# Patient Record
Sex: MALE
[De-identification: names, ages, dates, MRNs, and addresses within clinical notes are randomized per-mention and may not be internally consistent; named-entity substitution may affect disease eponyms.]

---

## 2021-10-20 PROBLEM — Z00.00 ENCOUNTER FOR PREVENTIVE HEALTH EXAMINATION: Status: ACTIVE | Noted: 2021-10-20

## 2021-10-27 ENCOUNTER — APPOINTMENT (OUTPATIENT)
Dept: PSYCHIATRY | Facility: CLINIC | Age: 31
End: 2021-10-27
Payer: COMMERCIAL

## 2021-10-27 PROCEDURE — 90791 PSYCH DIAGNOSTIC EVALUATION: CPT | Mod: 95

## 2021-11-01 ENCOUNTER — APPOINTMENT (OUTPATIENT)
Dept: PSYCHIATRY | Facility: CLINIC | Age: 31
End: 2021-11-01
Payer: COMMERCIAL

## 2021-11-01 PROCEDURE — 90834 PSYTX W PT 45 MINUTES: CPT | Mod: 95

## 2021-11-11 ENCOUNTER — APPOINTMENT (OUTPATIENT)
Dept: PSYCHIATRY | Facility: CLINIC | Age: 31
End: 2021-11-11
Payer: COMMERCIAL

## 2021-11-11 PROCEDURE — 90847 FAMILY PSYTX W/PT 50 MIN: CPT | Mod: 95

## 2021-11-18 ENCOUNTER — APPOINTMENT (OUTPATIENT)
Dept: PSYCHIATRY | Facility: CLINIC | Age: 31
End: 2021-11-18

## 2021-11-19 ENCOUNTER — APPOINTMENT (OUTPATIENT)
Dept: PSYCHIATRY | Facility: CLINIC | Age: 31
End: 2021-11-19

## 2021-11-19 ENCOUNTER — APPOINTMENT (OUTPATIENT)
Dept: PSYCHIATRY | Facility: CLINIC | Age: 31
End: 2021-11-19
Payer: COMMERCIAL

## 2021-11-19 PROCEDURE — 90834 PSYTX W PT 45 MINUTES: CPT | Mod: 95

## 2021-11-23 ENCOUNTER — APPOINTMENT (OUTPATIENT)
Dept: PSYCHIATRY | Facility: CLINIC | Age: 31
End: 2021-11-23
Payer: COMMERCIAL

## 2021-11-23 PROCEDURE — 90837 PSYTX W PT 60 MINUTES: CPT | Mod: 95

## 2021-11-24 ENCOUNTER — APPOINTMENT (OUTPATIENT)
Dept: PSYCHIATRY | Facility: CLINIC | Age: 31
End: 2021-11-24

## 2021-12-02 ENCOUNTER — APPOINTMENT (OUTPATIENT)
Dept: PSYCHIATRY | Facility: CLINIC | Age: 31
End: 2021-12-02
Payer: COMMERCIAL

## 2021-12-02 PROCEDURE — 90837 PSYTX W PT 60 MINUTES: CPT | Mod: 95

## 2021-12-09 ENCOUNTER — APPOINTMENT (OUTPATIENT)
Dept: PSYCHIATRY | Facility: CLINIC | Age: 31
End: 2021-12-09
Payer: COMMERCIAL

## 2021-12-09 PROCEDURE — 90837 PSYTX W PT 60 MINUTES: CPT | Mod: 95

## 2021-12-16 ENCOUNTER — APPOINTMENT (OUTPATIENT)
Dept: PSYCHIATRY | Facility: CLINIC | Age: 31
End: 2021-12-16
Payer: COMMERCIAL

## 2021-12-16 PROCEDURE — 90837 PSYTX W PT 60 MINUTES: CPT | Mod: 95

## 2021-12-23 ENCOUNTER — APPOINTMENT (OUTPATIENT)
Dept: PSYCHIATRY | Facility: CLINIC | Age: 31
End: 2021-12-23
Payer: COMMERCIAL

## 2021-12-23 PROCEDURE — 90837 PSYTX W PT 60 MINUTES: CPT | Mod: 95

## 2021-12-30 ENCOUNTER — APPOINTMENT (OUTPATIENT)
Dept: PSYCHIATRY | Facility: CLINIC | Age: 31
End: 2021-12-30

## 2022-01-06 ENCOUNTER — APPOINTMENT (OUTPATIENT)
Dept: PSYCHIATRY | Facility: CLINIC | Age: 32
End: 2022-01-06
Payer: COMMERCIAL

## 2022-01-06 PROCEDURE — 90837 PSYTX W PT 60 MINUTES: CPT | Mod: 95

## 2022-01-13 ENCOUNTER — APPOINTMENT (OUTPATIENT)
Dept: PSYCHIATRY | Facility: CLINIC | Age: 32
End: 2022-01-13
Payer: COMMERCIAL

## 2022-01-13 PROCEDURE — 90837 PSYTX W PT 60 MINUTES: CPT | Mod: 95

## 2022-01-20 ENCOUNTER — APPOINTMENT (OUTPATIENT)
Dept: PSYCHIATRY | Facility: CLINIC | Age: 32
End: 2022-01-20
Payer: COMMERCIAL

## 2022-01-20 PROCEDURE — 90837 PSYTX W PT 60 MINUTES: CPT | Mod: 95

## 2022-01-27 ENCOUNTER — APPOINTMENT (OUTPATIENT)
Dept: PSYCHIATRY | Facility: CLINIC | Age: 32
End: 2022-01-27
Payer: COMMERCIAL

## 2022-01-27 PROCEDURE — 90837 PSYTX W PT 60 MINUTES: CPT | Mod: 95

## 2022-02-03 ENCOUNTER — APPOINTMENT (OUTPATIENT)
Dept: PSYCHIATRY | Facility: CLINIC | Age: 32
End: 2022-02-03

## 2022-02-10 ENCOUNTER — APPOINTMENT (OUTPATIENT)
Dept: PSYCHIATRY | Facility: CLINIC | Age: 32
End: 2022-02-10
Payer: COMMERCIAL

## 2022-02-10 PROCEDURE — 90837 PSYTX W PT 60 MINUTES: CPT | Mod: 95

## 2022-02-17 ENCOUNTER — APPOINTMENT (OUTPATIENT)
Dept: PSYCHIATRY | Facility: CLINIC | Age: 32
End: 2022-02-17
Payer: COMMERCIAL

## 2022-02-17 PROCEDURE — 90837 PSYTX W PT 60 MINUTES: CPT | Mod: 95

## 2022-02-24 ENCOUNTER — APPOINTMENT (OUTPATIENT)
Dept: PSYCHIATRY | Facility: CLINIC | Age: 32
End: 2022-02-24
Payer: COMMERCIAL

## 2022-02-24 PROCEDURE — 90837 PSYTX W PT 60 MINUTES: CPT | Mod: 95

## 2022-03-03 ENCOUNTER — APPOINTMENT (OUTPATIENT)
Dept: PSYCHIATRY | Facility: CLINIC | Age: 32
End: 2022-03-03
Payer: COMMERCIAL

## 2022-03-03 PROCEDURE — 90837 PSYTX W PT 60 MINUTES: CPT | Mod: 95

## 2022-03-10 ENCOUNTER — APPOINTMENT (OUTPATIENT)
Dept: PSYCHIATRY | Facility: CLINIC | Age: 32
End: 2022-03-10
Payer: COMMERCIAL

## 2022-03-10 PROCEDURE — 90837 PSYTX W PT 60 MINUTES: CPT | Mod: 95

## 2022-03-16 ENCOUNTER — APPOINTMENT (OUTPATIENT)
Dept: PSYCHIATRY | Facility: CLINIC | Age: 32
End: 2022-03-16
Payer: COMMERCIAL

## 2022-03-16 PROCEDURE — 99205 OFFICE O/P NEW HI 60 MIN: CPT

## 2022-03-17 ENCOUNTER — APPOINTMENT (OUTPATIENT)
Dept: PSYCHIATRY | Facility: CLINIC | Age: 32
End: 2022-03-17
Payer: COMMERCIAL

## 2022-03-17 PROCEDURE — 90837 PSYTX W PT 60 MINUTES: CPT | Mod: 95

## 2022-03-24 ENCOUNTER — APPOINTMENT (OUTPATIENT)
Dept: PSYCHIATRY | Facility: CLINIC | Age: 32
End: 2022-03-24
Payer: COMMERCIAL

## 2022-03-24 PROCEDURE — 90837 PSYTX W PT 60 MINUTES: CPT | Mod: 95

## 2022-03-31 ENCOUNTER — APPOINTMENT (OUTPATIENT)
Dept: PSYCHIATRY | Facility: CLINIC | Age: 32
End: 2022-03-31
Payer: COMMERCIAL

## 2022-03-31 PROCEDURE — 90837 PSYTX W PT 60 MINUTES: CPT | Mod: 95

## 2022-04-07 ENCOUNTER — APPOINTMENT (OUTPATIENT)
Dept: PSYCHIATRY | Facility: CLINIC | Age: 32
End: 2022-04-07
Payer: COMMERCIAL

## 2022-04-07 PROCEDURE — 90837 PSYTX W PT 60 MINUTES: CPT | Mod: 95

## 2022-04-13 ENCOUNTER — APPOINTMENT (OUTPATIENT)
Dept: PSYCHIATRY | Facility: CLINIC | Age: 32
End: 2022-04-13
Payer: COMMERCIAL

## 2022-04-13 PROCEDURE — 99214 OFFICE O/P EST MOD 30 MIN: CPT | Mod: 95

## 2022-04-14 ENCOUNTER — APPOINTMENT (OUTPATIENT)
Dept: PSYCHIATRY | Facility: CLINIC | Age: 32
End: 2022-04-14
Payer: COMMERCIAL

## 2022-04-14 PROCEDURE — 90837 PSYTX W PT 60 MINUTES: CPT | Mod: 95

## 2022-04-21 ENCOUNTER — APPOINTMENT (OUTPATIENT)
Dept: PSYCHIATRY | Facility: CLINIC | Age: 32
End: 2022-04-21
Payer: COMMERCIAL

## 2022-04-21 PROCEDURE — 90837 PSYTX W PT 60 MINUTES: CPT | Mod: 95

## 2022-04-28 ENCOUNTER — APPOINTMENT (OUTPATIENT)
Dept: PSYCHIATRY | Facility: CLINIC | Age: 32
End: 2022-04-28
Payer: COMMERCIAL

## 2022-04-28 PROCEDURE — 90837 PSYTX W PT 60 MINUTES: CPT | Mod: 95

## 2022-05-12 ENCOUNTER — RX RENEWAL (OUTPATIENT)
Age: 32
End: 2022-05-12

## 2022-05-18 ENCOUNTER — APPOINTMENT (OUTPATIENT)
Dept: PSYCHIATRY | Facility: CLINIC | Age: 32
End: 2022-05-18
Payer: COMMERCIAL

## 2022-05-18 PROCEDURE — 99214 OFFICE O/P EST MOD 30 MIN: CPT | Mod: 95

## 2022-05-18 RX ORDER — ESCITALOPRAM OXALATE 5 MG/1
5 TABLET ORAL DAILY
Qty: 30 | Refills: 0 | Status: COMPLETED | COMMUNITY
Start: 2022-03-16 | End: 2022-05-18

## 2022-05-18 RX ORDER — PROPRANOLOL HYDROCHLORIDE 10 MG/1
10 TABLET ORAL
Qty: 30 | Refills: 0 | Status: ACTIVE | COMMUNITY
Start: 2022-03-16 | End: 1900-01-01

## 2022-05-26 ENCOUNTER — APPOINTMENT (OUTPATIENT)
Dept: PSYCHIATRY | Facility: CLINIC | Age: 32
End: 2022-05-26
Payer: COMMERCIAL

## 2022-05-26 PROCEDURE — 90837 PSYTX W PT 60 MINUTES: CPT | Mod: 95

## 2022-06-02 ENCOUNTER — APPOINTMENT (OUTPATIENT)
Dept: PSYCHIATRY | Facility: CLINIC | Age: 32
End: 2022-06-02
Payer: COMMERCIAL

## 2022-06-02 PROCEDURE — 90837 PSYTX W PT 60 MINUTES: CPT | Mod: 95

## 2022-06-09 ENCOUNTER — APPOINTMENT (OUTPATIENT)
Dept: PSYCHIATRY | Facility: CLINIC | Age: 32
End: 2022-06-09

## 2022-06-14 ENCOUNTER — RX RENEWAL (OUTPATIENT)
Age: 32
End: 2022-06-14

## 2022-06-20 ENCOUNTER — APPOINTMENT (OUTPATIENT)
Dept: PSYCHIATRY | Facility: CLINIC | Age: 32
End: 2022-06-20
Payer: COMMERCIAL

## 2022-06-20 PROCEDURE — 99214 OFFICE O/P EST MOD 30 MIN: CPT | Mod: 95

## 2022-06-20 RX ORDER — BUPROPION HYDROCHLORIDE 75 MG/1
75 TABLET, FILM COATED ORAL
Qty: 30 | Refills: 0 | Status: COMPLETED | COMMUNITY
Start: 2022-05-18 | End: 2022-06-20

## 2022-06-23 ENCOUNTER — APPOINTMENT (OUTPATIENT)
Dept: PSYCHIATRY | Facility: CLINIC | Age: 32
End: 2022-06-23
Payer: COMMERCIAL

## 2022-06-23 PROCEDURE — 90837 PSYTX W PT 60 MINUTES: CPT | Mod: 95

## 2022-06-30 ENCOUNTER — APPOINTMENT (OUTPATIENT)
Dept: PSYCHIATRY | Facility: CLINIC | Age: 32
End: 2022-06-30

## 2022-06-30 PROCEDURE — 90837 PSYTX W PT 60 MINUTES: CPT | Mod: 95

## 2022-07-14 ENCOUNTER — APPOINTMENT (OUTPATIENT)
Dept: PSYCHIATRY | Facility: CLINIC | Age: 32
End: 2022-07-14

## 2022-07-14 PROCEDURE — 90837 PSYTX W PT 60 MINUTES: CPT | Mod: 95

## 2022-07-21 ENCOUNTER — APPOINTMENT (OUTPATIENT)
Dept: PSYCHIATRY | Facility: CLINIC | Age: 32
End: 2022-07-21

## 2022-07-29 ENCOUNTER — APPOINTMENT (OUTPATIENT)
Dept: PSYCHIATRY | Facility: CLINIC | Age: 32
End: 2022-07-29

## 2022-07-29 PROCEDURE — 99214 OFFICE O/P EST MOD 30 MIN: CPT | Mod: 95

## 2022-08-18 ENCOUNTER — APPOINTMENT (OUTPATIENT)
Dept: PSYCHIATRY | Facility: CLINIC | Age: 32
End: 2022-08-18

## 2022-08-18 PROCEDURE — 90837 PSYTX W PT 60 MINUTES: CPT | Mod: 95

## 2022-08-25 ENCOUNTER — NON-APPOINTMENT (OUTPATIENT)
Age: 32
End: 2022-08-25

## 2022-08-25 ENCOUNTER — APPOINTMENT (OUTPATIENT)
Dept: PSYCHIATRY | Facility: CLINIC | Age: 32
End: 2022-08-25

## 2022-08-26 ENCOUNTER — APPOINTMENT (OUTPATIENT)
Dept: PSYCHIATRY | Facility: CLINIC | Age: 32
End: 2022-08-26

## 2022-08-26 PROCEDURE — 99214 OFFICE O/P EST MOD 30 MIN: CPT | Mod: 95

## 2022-09-08 ENCOUNTER — APPOINTMENT (OUTPATIENT)
Dept: PSYCHIATRY | Facility: CLINIC | Age: 32
End: 2022-09-08

## 2022-09-08 PROCEDURE — 90837 PSYTX W PT 60 MINUTES: CPT | Mod: 95

## 2022-09-15 ENCOUNTER — APPOINTMENT (OUTPATIENT)
Dept: PSYCHIATRY | Facility: CLINIC | Age: 32
End: 2022-09-15

## 2022-09-15 PROCEDURE — 90837 PSYTX W PT 60 MINUTES: CPT | Mod: 95

## 2022-09-22 ENCOUNTER — APPOINTMENT (OUTPATIENT)
Dept: PSYCHIATRY | Facility: CLINIC | Age: 32
End: 2022-09-22

## 2022-10-06 ENCOUNTER — APPOINTMENT (OUTPATIENT)
Dept: PSYCHIATRY | Facility: CLINIC | Age: 32
End: 2022-10-06

## 2022-10-06 PROCEDURE — 90837 PSYTX W PT 60 MINUTES: CPT | Mod: 95

## 2022-10-07 NOTE — PLAN
[FreeTextEntry2] : Increased understanding of PTSD (psychoeducation, IFS, psychodynamic therapy)\par Identify cues and symptoms that he is experiencing PTSD (and similar symptoms of anxiety)\par Develop vocabulary to describe anxiety or fears\par Increased understanding of interpersonal dynamics and consequences of abandonment and neglect by either or both parents.\par  [Psychodynamic Therapy] : Psychodynamic Therapy  [Supportive Therapy] : Supportive Therapy [Other: ____] : [unfilled] [de-identified] : Pt. was engaged in session; affect and mood WNL. This session pt. and SW discussed and processed his self image, and how he has historically "minimized his achievements and strengths" for fear of seeming arrogant. Pt. spoke about his self image and being uncomfortable being certain places and being recognized for certain things if he feels he has not "earned or deserved" the accomplishment. SW empathized with pt. and explored this part of him in the context of childhood family dynamics and his relationship with his father.  [Recommended Frequency of Visits: ____] : Recommended frequency of visits: [unfilled] [Return in ____ week(s)] : Return in [unfilled] week(s) [FreeTextEntry1] : Meet for weekly individual counseling sessions; level of care appropriate.

## 2022-10-07 NOTE — REASON FOR VISIT
[Home] : at home, [unfilled] , at the time of the visit. [Medical Office: ([facility])___] : at the medical office located in  [Self] : self [This encounter was initiated by telehealth (audio with video) and converted to telephone (audio only) due to technical difficulties.] : This encounter was initiated by telehealth (audio with video) and converted to telephone (audio only) due to technical difficulties. [FreeTextEntry2] : Jose Barr [Patient] : Patient

## 2022-10-07 NOTE — PHYSICAL EXAM
[Well groomed] : well groomed [Cooperative] : cooperative [Euthymic] : euthymic [Full] : full [Clear] : clear [Linear/Goal Directed] : linear/goal directed [None] : none [None Reported] : none reported [Average] : average [WNL] : within normal limits

## 2022-10-13 ENCOUNTER — APPOINTMENT (OUTPATIENT)
Dept: PSYCHIATRY | Facility: CLINIC | Age: 32
End: 2022-10-13

## 2022-10-13 ENCOUNTER — NON-APPOINTMENT (OUTPATIENT)
Age: 32
End: 2022-10-13

## 2022-10-27 ENCOUNTER — NON-APPOINTMENT (OUTPATIENT)
Age: 32
End: 2022-10-27

## 2022-10-27 ENCOUNTER — APPOINTMENT (OUTPATIENT)
Dept: PSYCHIATRY | Facility: CLINIC | Age: 32
End: 2022-10-27

## 2022-11-10 ENCOUNTER — APPOINTMENT (OUTPATIENT)
Dept: PSYCHIATRY | Facility: CLINIC | Age: 32
End: 2022-11-10

## 2022-11-10 PROCEDURE — 90837 PSYTX W PT 60 MINUTES: CPT | Mod: 95

## 2022-11-11 NOTE — REASON FOR VISIT
[Home] : at home, [unfilled] , at the time of the visit. [Medical Office: ([facility])___] : at the medical office located in  [Patient] : the patient [Self] : self [This encounter was initiated by telehealth (audio with video) and converted to telephone (audio only) due to technical difficulties.] : This encounter was initiated by telehealth (audio with video) and converted to telephone (audio only) due to technical difficulties. [FreeTextEntry2] : Jose Barr

## 2022-11-11 NOTE — PLAN
[FreeTextEntry2] : Increased understanding of PTSD (psychoeducation, IFS, psychodynamic therapy)\par Identify cues and symptoms that he is experiencing PTSD (and similar symptoms of anxiety)\par Develop vocabulary to describe anxiety or fears\par Increased understanding of interpersonal dynamics and consequences of abandonment and neglect by either or both parents.\par  [Acceptance and Commitment Therapy] : Acceptance and Commitment Therapy  [Psychodynamic Therapy] : Psychodynamic Therapy  [Psychoeducation] : Psychoeducation  [Supportive Therapy] : Supportive Therapy [de-identified] : Pt. was engaged in session; session was conducted via [employer]. This session pt. and SW discussed and processed romantic relationship dynamics. Pt. reports that he and his significant other are trying to improve there communication and intimacy. Pt. reports that he feels he is falling short and disappointing his partner by not being more affectionate and proactive in planning dates and spending time with one another. Pt. expressed anger towards his girlfriend and himself for not being able to resolve this conflict and it being a constant theme in there relationship. Pt. reports he is scared about making more of a commitment at this time, but also feels it would help show his girlfriend that he wants to spend his life with her. SW empathized with pt. and explored his reservations about marriage and making a commitment to his significant other.  [Recommended Frequency of Visits: ____] : Recommended frequency of visits: [unfilled] [Return in ____ week(s)] : Return in [unfilled] week(s) [FreeTextEntry1] : Meet for weekly individual counseling sessions; level of care appropriate.

## 2022-12-01 ENCOUNTER — APPOINTMENT (OUTPATIENT)
Dept: PSYCHIATRY | Facility: CLINIC | Age: 32
End: 2022-12-01

## 2022-12-01 DIAGNOSIS — F32.A DEPRESSION, UNSPECIFIED: ICD-10-CM

## 2022-12-01 DIAGNOSIS — F41.9 ANXIETY DISORDER, UNSPECIFIED: ICD-10-CM

## 2022-12-01 PROCEDURE — 90837 PSYTX W PT 60 MINUTES: CPT | Mod: 95

## 2022-12-01 NOTE — DISCUSSION/SUMMARY
[FreeTextEntry1] : Pt. called day before appointment to cancel appointment. This is patients third no show/cancellation in a two month period. SW spoke with pt. about attendance, and he indicates that he wants to continue therapy, and apologized for missing his appointments. SW informed pt. that he will only schedule him for one appointment at this time and and see if his attendance improves.

## 2022-12-04 PROBLEM — F32.A DEPRESSION: Status: ACTIVE | Noted: 2022-03-16

## 2022-12-04 PROBLEM — F41.9 ANXIETY: Status: ACTIVE | Noted: 2022-03-16

## 2022-12-04 NOTE — PLAN
[Recommended Frequency of Visits: ____] : Recommended frequency of visits: [unfilled] [Return in ____ week(s)] : Return in [unfilled] week(s) [FreeTextEntry2] : Increased understanding of PTSD (psychoeducation, IFS, psychodynamic therapy)\par Identify cues and symptoms that he is experiencing PTSD (and similar symptoms of anxiety)\par Develop vocabulary to describe anxiety or fears\par Increased understanding of interpersonal dynamics and consequences of abandonment and neglect by either or both parents.\par  [Psychodynamic Therapy] : Psychodynamic Therapy  [Psychoeducation] : Psychoeducation  [Supportive Therapy] : Supportive Therapy [de-identified] : Pt. was engaged in session; session was conducted via [employer]. This session pt. shared that he proposed to his significant other since our last session. Pt. described how he proposed, and what he was feeling during this experience. Pt. reports that his significant other said "yes" and they are currently engaged. Pt. reports that he and his significant other found themselves in an argument regarding the discrepancy between there beliefs about marriage and intimacy. Pt. reports that he felt discouraged when his significant other commented on the timing of his proposal, as well as her need for more affection from him. SW empathized with pt. and explored his fears and hopes now that he is engaged and planning on marrying his significant other.  [FreeTextEntry1] : Meet for weekly individual counseling sessions; level of care appropriate.

## 2022-12-04 NOTE — REASON FOR VISIT
[Home] : at home, [unfilled] , at the time of the visit. [Medical Office: ([facility])___] : at the medical office located in  [Patient] : the patient [Self] : self [This encounter was initiated by telehealth (audio with video) and converted to telephone (audio only) due to technical difficulties.] : This encounter was initiated by telehealth (audio with video) and converted to telephone (audio only) due to technical difficulties. [FreeTextEntry2] : Jose Barr

## 2022-12-15 ENCOUNTER — APPOINTMENT (OUTPATIENT)
Dept: PSYCHIATRY | Facility: CLINIC | Age: 32
End: 2022-12-15

## 2022-12-29 ENCOUNTER — APPOINTMENT (OUTPATIENT)
Dept: PSYCHIATRY | Facility: CLINIC | Age: 32
End: 2022-12-29

## 2023-01-03 ENCOUNTER — APPOINTMENT (OUTPATIENT)
Dept: PSYCHIATRY | Facility: CLINIC | Age: 33
End: 2023-01-03
Payer: COMMERCIAL

## 2023-01-03 PROCEDURE — 99214 OFFICE O/P EST MOD 30 MIN: CPT | Mod: 95

## 2023-01-03 RX ORDER — BUPROPION HYDROCHLORIDE 300 MG/1
300 TABLET, EXTENDED RELEASE ORAL DAILY
Qty: 30 | Refills: 0 | Status: ACTIVE | COMMUNITY
Start: 2022-06-20 | End: 1900-01-01

## 2023-01-03 RX ORDER — BUPROPION HYDROCHLORIDE 150 MG/1
150 TABLET, EXTENDED RELEASE ORAL
Qty: 30 | Refills: 0 | Status: ACTIVE | COMMUNITY
Start: 2023-01-03 | End: 1900-01-01

## 2023-01-03 NOTE — HISTORY OF PRESENT ILLNESS
[de-identified] : Writer has not seen the patient, since Aug 2022. At that time no medication changes.\par \par Mood: OK. Just got a new puppy. Not so happy about training it and getting it home bound. Also it is the first day to go back to work which he is not so happy about since being off. \par Sleepin.5 hours. States he has GLEN . Has a mouth guard he is trying out. Has no CPAP but states he will get one if the mouth guard does not work. \par Appetite is "too good" has not changed since being on Wellbutrin. \par Energy and Motivation could be better. Concentration is good. \par Denies any AVH, SI or HI.  [Home] : at home, [unfilled] , at the time of the visit. [Medical Office: ([facility])___] : at the medical office located in  [Verbal consent obtained from patient] : the patient, [unfilled]

## 2023-01-03 NOTE — PHYSICAL EXAM
[None] : none [Anxious] : anxious [Constricted] : constricted [Normal] : alert, oriented to person, place, and time [Fair] : fair [FreeTextEntry8] : "I'm depressed." better [FreeTextEntry9] : better

## 2023-01-03 NOTE — DISCUSSION/SUMMARY
[FreeTextEntry1] : Assessment: Patient is a 30 yo AA male with h/o depression and anxiety seen today for medication management.  Patient is compliant with medications and tolerating without any new reported side effects. I-stop reviewed and no issues noticed.\par \par \par PLAN:\par Increase Wellbutrin  to 450 mg PO QAM for depression, low motivation, energy, concentration and decrease SSRI induced sexual dysfunction.\par Continue Lexapro 20 mg PO QAM for depression and anxiety\par Continue Propranolol 10 mg PRN for anxiety\par -Discussed risks and benefits of medications including side effects of GI and sexual side effects with SSRI. Alternative strategies including no intervention discussed with patient. Patient consents to current medications as prescribed.\par - Discussed with patient regarding importance of abstinence and sobriety from alcohol and drugs. Educated about relationship between worsening mood/anxiety symptoms and drug use and improvement of symptoms with abstinence. \par - Discussed about unpredictable effects including cardiorespiratory collapse from the combination of illicit drugs and prescribed medications. Patient verbalized understanding.\par - Patient understands to contact clinic prn with concerns and agrees to call 911 or go to nearest ER if symptoms worsen.\par - Next appointment made in 1 month. Patient was in no acute distress

## 2023-01-03 NOTE — PAST MEDICAL HISTORY
[FreeTextEntry1] : H/o anxiety and depression\par \par Denies any inpatient hospitalization admission \par \par Past SI attempts: denies\par \par Therapy: Sees Jose Ellis since 10/2021\par \par Psychiatrist: denies\par \par Medication trials: denies\par \par Current medications: none\par \par Firearms: denies\par Medical: denies\par

## 2023-01-03 NOTE — CURRENT PSYCHIATRIC SYMPTOMS
[Depressed Mood] : depressed mood [Anhedonia] : anhedonia [Guilt] : feeling guilty [Decreased Concentration] : decreased concentrating ability [Hypersomnia] : hypersomnia [Ruminations] : ruminations [Hypochondriasis] : hypochondriasis [Panic] : panic  [de-identified] : better [de-identified] : denies [de-identified] : denies [de-identified] : better [de-identified] : denies [de-identified] : denies [de-identified] : denies

## 2023-01-03 NOTE — SOCIAL HISTORY
[Alone] : lives alone [Employed] : employed [Committed Relationship] : in a committed relationship [College] : College [None] : none [FreeTextEntry1] : Born: [place], [place]\par Siblings: 1 brother (42) and 1 sister (31)\par Parents: alive, together. Patient does not talk to then about his problems because he prefers to deal with it on his own. They both live in [place]. \par Education: Bachelors in integrated physical animation. 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

## 2023-05-31 RX ORDER — ESCITALOPRAM OXALATE 20 MG/1
20 TABLET ORAL DAILY
Qty: 30 | Refills: 0 | Status: ACTIVE | COMMUNITY
Start: 2022-03-16 | End: 1900-01-01

## 2023-06-27 ENCOUNTER — APPOINTMENT (OUTPATIENT)
Dept: PSYCHIATRY | Facility: CLINIC | Age: 33
End: 2023-06-27

## 2023-08-28 ENCOUNTER — APPOINTMENT (OUTPATIENT)
Dept: HUMAN REPRODUCTION | Facility: CLINIC | Age: 33
End: 2023-08-28

## 2023-10-02 ENCOUNTER — APPOINTMENT (OUTPATIENT)
Dept: HUMAN REPRODUCTION | Facility: CLINIC | Age: 33
End: 2023-10-02
Payer: COMMERCIAL

## 2023-10-02 PROCEDURE — 36415 COLL VENOUS BLD VENIPUNCTURE: CPT

## 2024-01-04 ENCOUNTER — APPOINTMENT (OUTPATIENT)
Dept: HUMAN REPRODUCTION | Facility: CLINIC | Age: 34
End: 2024-01-04

## 2024-04-04 ENCOUNTER — APPOINTMENT (OUTPATIENT)
Dept: HUMAN REPRODUCTION | Facility: CLINIC | Age: 34
End: 2024-04-04
Payer: SELF-PAY

## 2024-04-04 PROCEDURE — 89322 SEMEN ANAL STRICT CRITERIA: CPT

## 2024-05-09 ENCOUNTER — APPOINTMENT (OUTPATIENT)
Dept: HUMAN REPRODUCTION | Facility: CLINIC | Age: 34
End: 2024-05-09